# Patient Record
Sex: MALE | Race: WHITE
[De-identification: names, ages, dates, MRNs, and addresses within clinical notes are randomized per-mention and may not be internally consistent; named-entity substitution may affect disease eponyms.]

---

## 2019-09-03 ENCOUNTER — HOSPITAL ENCOUNTER (EMERGENCY)
Dept: HOSPITAL 44 - ED | Age: 54
Discharge: HOME | End: 2019-09-03
Payer: SELF-PAY

## 2019-09-03 VITALS — SYSTOLIC BLOOD PRESSURE: 121 MMHG | DIASTOLIC BLOOD PRESSURE: 77 MMHG

## 2019-09-03 DIAGNOSIS — T63.451A: Primary | ICD-10-CM

## 2019-09-03 PROCEDURE — 96374 THER/PROPH/DIAG INJ IV PUSH: CPT

## 2019-09-03 PROCEDURE — 99284 EMERGENCY DEPT VISIT MOD MDM: CPT

## 2019-09-03 PROCEDURE — 99283 EMERGENCY DEPT VISIT LOW MDM: CPT

## 2019-09-03 PROCEDURE — 96360 HYDRATION IV INFUSION INIT: CPT

## 2019-09-03 PROCEDURE — S1016 NON-PVC INTRAVENOUS ADMINIST: HCPCS

## 2019-09-03 RX ADMIN — FAMOTIDINE ONE: 10 INJECTION INTRAVENOUS at 15:08

## 2019-09-03 RX ADMIN — RETINOL, ERGOCALCIFEROL, .ALPHA.-TOCOPHEROL ACETATE, DL-, PHYTONADIONE, ASCORBIC ACID, NIACINAMIDE, RIBOFLAVIN 5-PHOSPHATE SODIUM, THIAMINE HYDROCHLORIDE, PYRIDOXINE HYDROCHLORIDE, DEXPANTHENOL, BIOTIN, FOLIC ACID, AND CYANOCOBALAMIN ONE MLS/HR: KIT at 14:50

## 2019-09-03 RX ADMIN — FAMOTIDINE ONE MG: 10 INJECTION INTRAVENOUS at 14:55

## 2019-09-03 NOTE — ED PHYSICIAN DOCUMENTATION
General Adult





- HISTORIAN


Historian: patient





- HPI


Stated Complaint: hornet sting


Chief Complaint: General Adult


Additional Information: 





Patient presents to ER after being stung by ground hornets with at work today, 

just prior to arrival.  Patient states he is allergic to bee stings and has an 

epipen however is not sure if he should use it.  Patient states in the past he 

has been stung, "my vital signs go way down".  He reports the last time he was 

stung he used his epipen and went to the urgent care.  He was transferred to 

[facility] for further treatment.  He was not admitted.  He denies any 

shortness of breath, chest pain, nausea/vomiting or diaphoresis.  


Onset: minutes (20)


Timing: still present


Severity: moderate





- ROS


CONST: denies: sweating, weakness


EYES/ENT: none


CVS/RESP: denies: chest pain, shortness of breath


GI/: denies: vomiting, nausea


MS/SKIN/LYMPH: none


NEURO/PSYCH: denies: headache, dizziness





- PAST HX


Past History: none


Other History: none


Surgeries/Procedures: none


Allergies/Adverse Reactions: 


                                    Allergies











Allergy/AdvReac Type Severity Reaction Status Date / Time


 


bee venom protein (honey bee) AdvReac  Anaphylaxis Verified 09/03/19 15:07

















- SOCIAL HX


Smoking History: non-smoker


Alcohol Use: none


Drug Use: none





- FAMILY HX


Family History: No





- REVIEWED ASSESSMENTS


Nursing Assessment  Reviewed: Yes


Vitals Reviewed: Yes





Progress





- Progress


Progress: 





1530  Patient is still asymptomatic.  Patient states he is sleepy.  





ED Results Lab/Radiology





- Orders


Orders: 





                                    ED Orders











 Category Date Time Status


 


 Place IV Lock 1T Care  09/03/19 14:38 Ordered


 


 Famotidine/Pf [Pepcid] Med  09/03/19 14:38 Once





 40 mg IV NOW ONE   


 


 NORMAL SALINE @ 1000 MLS/HR ( 1000ml BOLUS) Med  09/03/19 14:39 Ordered





 0.9 % Sodium Chloride [Normal Saline] 1,000 ml   





 IV Q1H   


 


 diphenhydrAMINE HCL [Benadryl] Med  09/03/19 14:38 Once





 50 mg IVP NOW ONE   


 


 methylPREDNISolone SOD SUCC [SOLU-Medrol] Med  09/03/19 14:38 Once





 125 mg IVP NOW ONE   














General Adult Physical Exam





- PHYSICAL EXAM


GENERAL APPEARANCE: no distress


EENT: KHALIDA


NECK: No: lymphadenopathy


RESPIRATORY: no resp distress, chest non-tender, breath sounds normal


CVS: reg rate & rhythm, heart sounds normal


ABDOMEN: soft, normal bowel sounds.  No: tenderness


BACK: normal inspection


SKIN: warm/dry, normal color


EXTREMITIES: non-tender


NEURO: oriented X3





Discharge


Clincal Impression: 


Hornet sting


Qualifiers:


 Encounter type: initial encounter Injury intent: accidental or unintentional 

Qualified Code(s): T63.451A - Toxic effect of venom of hornets, accidental 

(unintentional), initial encounter





Referrals: 


Primary Doctor,No [Primary Care Provider] - 2 Days


Additional Instructions: 


1.  Benedryl 50mg every 4-6 hours as needed for itch/rash


2.  Follow up with PCP within 1 week


3.  Use epipen if symptoms indicated


4.  Return to ER for new or worsening symptoms (use of epipen)


Condition: Stable


Disposition: 01 HOME, SELF-CARE


Decision to Admit: NO


Date of Decison to Admit: 09/03/19


Decision Time: 15:31